# Patient Record
Sex: MALE | Race: WHITE | NOT HISPANIC OR LATINO | Employment: FULL TIME | URBAN - METROPOLITAN AREA
[De-identification: names, ages, dates, MRNs, and addresses within clinical notes are randomized per-mention and may not be internally consistent; named-entity substitution may affect disease eponyms.]

---

## 2024-01-25 ENCOUNTER — APPOINTMENT (EMERGENCY)
Dept: RADIOLOGY | Facility: HOSPITAL | Age: 24
End: 2024-01-25

## 2024-01-25 ENCOUNTER — HOSPITAL ENCOUNTER (EMERGENCY)
Facility: HOSPITAL | Age: 24
Discharge: HOME | End: 2024-01-25
Attending: EMERGENCY MEDICINE

## 2024-01-25 VITALS
WEIGHT: 280 LBS | OXYGEN SATURATION: 100 % | BODY MASS INDEX: 40.09 KG/M2 | HEART RATE: 115 BPM | SYSTOLIC BLOOD PRESSURE: 178 MMHG | RESPIRATION RATE: 18 BRPM | HEIGHT: 70 IN | TEMPERATURE: 97.8 F | DIASTOLIC BLOOD PRESSURE: 109 MMHG

## 2024-01-25 DIAGNOSIS — S62.309A METACARPAL BONE FRACTURE: Primary | ICD-10-CM

## 2024-01-25 DIAGNOSIS — F32.A DEPRESSION, UNSPECIFIED DEPRESSION TYPE: ICD-10-CM

## 2024-01-25 PROCEDURE — 73130 X-RAY EXAM OF HAND: CPT

## 2024-01-25 PROCEDURE — 99284 EMERGENCY DEPT VISIT MOD MDM: CPT

## 2024-01-25 PROCEDURE — 99284 EMERGENCY DEPT VISIT MOD MDM: CPT | Performed by: EMERGENCY MEDICINE

## 2024-01-25 PROCEDURE — 29125 APPL SHORT ARM SPLINT STATIC: CPT | Performed by: EMERGENCY MEDICINE

## 2024-01-26 VITALS
BODY MASS INDEX: 40.09 KG/M2 | HEIGHT: 70 IN | DIASTOLIC BLOOD PRESSURE: 100 MMHG | SYSTOLIC BLOOD PRESSURE: 178 MMHG | WEIGHT: 280 LBS

## 2024-01-26 DIAGNOSIS — S62.366A CLOSED NONDISPLACED FRACTURE OF NECK OF FIFTH METACARPAL BONE OF RIGHT HAND, INITIAL ENCOUNTER: Primary | ICD-10-CM

## 2024-01-26 PROBLEM — S62.336A CLOSED DISPLACED FRACTURE OF NECK OF FIFTH METACARPAL BONE OF RIGHT HAND, INITIAL ENCOUNTER: Status: ACTIVE | Noted: 2024-01-26

## 2024-01-26 RX ORDER — NAPROXEN 500 MG/1
500 TABLET ORAL 2 TIMES DAILY WITH MEALS
Qty: 60 TABLET | Refills: 0 | Status: SHIPPED | OUTPATIENT
Start: 2024-01-26

## 2024-01-26 RX ORDER — TRAMADOL HYDROCHLORIDE 50 MG/1
50 TABLET ORAL
Qty: 10 TABLET | Refills: 0 | Status: SHIPPED | OUTPATIENT
Start: 2024-01-26

## 2024-01-26 NOTE — ED NOTES
This writer discussed the patients current presentation and recommended discharge plan with .  They agree with the patient being discharged at this time with referrals and/or information about Outpatient Therapy      The patient was provided with referral information for:   Outpatient therapy providers.     This writer and the patient completed a safety plan.  The patient was provided with a copy of their safety plan with encouragement to utilize the plan following discharge.   In addition, the patient was instructed to call local [facility] crisis, other crisis services, 911 or to go to the nearest ER immediately if their situation changes at any time.         This writer discussed discharge plans with the patient  who agrees with and understands the discharge plans.         SAFETY PLAN  Warning Signs (thoughts, images, mood, behavior, situations) of a potential crisis: Stressed, depressed      Coping Skills (what can I do to take my mind off the problem, or to keep myself safe): Talk with my friends      Outside Support (who can I reach out to for support and help): Mobile Crisis        [place] Suicide Prevention Hotline:  988      [facility] [phone] - Crisis   [facility] [phone] - LVF Crisis/Mobile Crisis   [phone] - S[facility] Crisis   [facility]: [phone]  [facility]: [phone]   [facility] [phone] - Crisis   [facility] [phone] - Crisis     [facility] [phone] - Crisis   [facility] [phone] - Crisis   [phone] - Peer Support Talk Line (1-9pm daily)  [phone] - Teen Support Talk Line (1-9pm daily)  [phone] - [facility] [phone]- Crisis    [facility] [phone] - Crisis   [facility] [phone] - Crisis    [facility]) [phone] - [facility] Crisis

## 2024-01-26 NOTE — ED NOTES
24 y.o male patient presented to the ED for a hand injury and requested to speak to crisis about SI with no plan.  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 Pt is feeling overwhelmed and stressed with taking care of his Mother..  Pt stated he punched a wall due to being upset.  Pt stated he has been having SI about ending his life when his mother passes.  Pt stated his worries is causing him to have depression and anxiety. Pt stated he has no current plan to end his life but has thought about shooting himself with a gun a while back.  Pt stated he has no access to any guns.  Pt stated he had a suicidal attempt when he was about 12 years old by attempting to overdose.  Pt was hospitalized x2 for mental health at age 12.  Pt has no suicidal attempts since that time at age 12.  Pt currently has no outpatient Psychiatry or Therapist.  Pt denies any legal and hard core substance abuse issues.  Pt stated he does smoke marijuana and drinks occasionally.  Pt requested outpatient treatment information and does not want to go inpatient. Pt provided a safety plan and friend who accompanied him agreed to support the patient. Provider was in agreement with this plan.

## 2024-01-26 NOTE — ED PROVIDER NOTES
History  Chief Complaint   Patient presents with    Finger Injury     Pt reports he punched the wall b/c he was angry at life. Pt reports no pain in right hand. UTD on tetnus. Pt does admit SI with intent no plan after his mom passess away. Has been inpt before. He is looking to speak to crisis for output referrals.        24-year-old male, presents with right hand injury after punching a wall.  Patient states he was upset and punched a wall.  Has swelling in right hand, denies any current pain.  Denies any other injury.  Patient does state that he has been depressed, does have thoughts about ending his life.  Patient states his mother is very sick which is contributing to his depression.  Has not attempted to harm himself.  Patient states he smokes marijuana and drinks occasionally, denies any other drug use.    Family friend who is with patient is concerned that he is suicidal and needs to talk with a crisis worker.      History provided by:  Patient and friend   used: No        None       Past Medical History:   Diagnosis Date    Asthma     GERD (gastroesophageal reflux disease)     IBS (irritable bowel syndrome)        History reviewed. No pertinent surgical history.    History reviewed. No pertinent family history.  I have reviewed and agree with the history as documented.    E-Cigarette/Vaping     E-Cigarette/Vaping Substances     Social History     Tobacco Use    Smoking status: Never    Smokeless tobacco: Never   Substance Use Topics    Alcohol use: Yes     Alcohol/week: 1.0 standard drink of alcohol     Types: 1 Shots of liquor per week     Comment: whiskey    Drug use: Yes     Types: Marijuana       Review of Systems   Constitutional: Negative.    Neurological: Negative.        Physical Exam  Physical Exam  Vitals and nursing note reviewed.   Constitutional:       General: He is not in acute distress.  HENT:      Head: Normocephalic and atraumatic.   Cardiovascular:      Rate and  Rhythm: Tachycardia present.   Pulmonary:      Effort: Pulmonary effort is normal.   Musculoskeletal:         General: Normal range of motion.      Comments: Swelling over right dorsal fifth MP joints, full range of motion, no tenderness   Skin:     General: Skin is warm and dry.   Neurological:      General: No focal deficit present.      Mental Status: He is alert and oriented to person, place, and time.   Psychiatric:      Comments: Depressed mood, calm and cooperative         Vital Signs  ED Triage Vitals [01/25/24 1929]   Temperature Pulse Respirations Blood Pressure SpO2   97.8 °F (36.6 °C) (!) 115 18 (!) 178/109 100 %      Temp Source Heart Rate Source Patient Position - Orthostatic VS BP Location FiO2 (%)   Oral Monitor Sitting Right arm --      Pain Score       No Pain           Vitals:    01/25/24 1929   BP: (!) 178/109   Pulse: (!) 115   Patient Position - Orthostatic VS: Sitting         Visual Acuity      ED Medications  Medications - No data to display    Diagnostic Studies  Results Reviewed       None                   XR hand 3+ views RIGHT    (Results Pending)              Procedures  Splint application    Date/Time: 1/25/2024 8:28 PM    Performed by: Kali Heredia MD  Authorized by: Kali Heredia MD  Universal Protocol:  Consent: Verbal consent obtained.  Consent given by: patient    Pre-procedure details:     Sensation:  Normal  Procedure details:     Laterality:  Right    Location:  Hand    Hand:  R hand    Splint type:  Ulnar gutter    Supplies:  Ortho-Glass, sling and elastic bandage  Post-procedure details:     Pain:  Unchanged    Sensation:  Normal    Patient tolerance of procedure:  Tolerated well, no immediate complications  Comments:      Right ulnar gutter splint placed for fifth metacarpal fracture, patient placed in sling.  Normal capillary refill and sensation in fingers after splint application.           ED Course  ED Course as of 01/25/24 2030   Thu Jan 25, 2024 2003 X-ray  right hand independently reviewed by myself, right fifth metacarpal fracture noted.                               SBIRT 20yo+      Flowsheet Row Most Recent Value   Initial Alcohol Screen: US AUDIT-C     1. How often do you have a drink containing alcohol? 0 Filed at: 01/25/2024 1929   2. How many drinks containing alcohol do you have on a typical day you are drinking?  0 Filed at: 01/25/2024 1929   3a. Male UNDER 65: How often do you have five or more drinks on one occasion? 0 Filed at: 01/25/2024 1929   3b. FEMALE Any Age, or MALE 65+: How often do you have 4 or more drinks on one occassion? 0 Filed at: 01/25/2024 1929   Audit-C Score 0 Filed at: 01/25/2024 1929   NATHAN: How many times in the past year have you...    Used an illegal drug or used a prescription medication for non-medical reasons? Never Filed at: 01/25/2024 1929                      Medical Decision Making  24-year-old male, presents with right hand injury after punching wall.  Patient also reports depression and suicidal ideation.  Differential diagnosis is hand contusion, fracture along with depression, psychosis among other diagnoses.  X-ray of hand ordered, will have ED crisis worker talk with the patient.    Patient evaluated by ED crisis worker, denies any current suicidal ideation, does not believe he requires psychiatric hospitalization at this time and would like to go home with outpatient resources.  Patient feels safe being discharged home.    Placed in splint for fifth metacarpal fracture, ambulatory referral for hand surgeon order placed, patient instructed to follow-up for further evaluation and treatment.    Discussed with patient return to emergency department for any worsening depression or new concerning symptoms.    Amount and/or Complexity of Data Reviewed  Radiology: ordered and independent interpretation performed. Decision-making details documented in ED Course.           I have reviewed test results and diagnosis with patient.   Follow-up plan reviewed.  Precautions for acute return for re-evaluation are reviewed.  Opportunity to ask questions was provided.  Patient verbalizes understanding.    Disposition  Final diagnoses:   Metacarpal bone fracture   Depression, unspecified depression type     Time reflects when diagnosis was documented in both MDM as applicable and the Disposition within this note       Time User Action Codes Description Comment    1/25/2024  8:24 PM Kali Heredia Add [S62.309A] Metacarpal bone fracture     1/25/2024  8:24 PM Kali Heredia Add [F32.A] Depression, unspecified depression type           ED Disposition       ED Disposition   Discharge    Condition   Stable    Date/Time   Thu Jan 25, 2024 2025    Comment   Herman Bautista discharge to home/self care.                   Follow-up Information       Follow up With Specialties Details Why Contact Info    Chi Ritchie MD Orthopedic Surgery, Hand Surgery Schedule an appointment as soon as possible for a visit   [address] [place] [address]  [phone]              Patient's Medications    No medications on file           PDMP Review       None            ED Provider  Electronically Signed by             Kali Heredia MD  01/25/24 3383

## 2024-01-26 NOTE — DISCHARGE INSTRUCTIONS
This writer discussed the patients current presentation and recommended discharge plan with .  They agree with the patient being discharged at this time with referrals and/or information about Outpatient Therapy      The patient was provided with referral information for:   Outpatient therapy providers.      This writer and the patient completed a safety plan.  The patient was provided with a copy of their safety plan with encouragement to utilize the plan following discharge.   In addition, the patient was instructed to call local [facility] crisis, other crisis services, 911 or to go to the nearest ER immediately if their situation changes at any time.            This writer discussed discharge plans with the patient  who agrees with and understands the discharge plans.            SAFETY PLAN  Warning Signs (thoughts, images, mood, behavior, situations) of a potential crisis: Stressed, depressed        Coping Skills (what can I do to take my mind off the problem, or to keep myself safe): Talk with my friends        Outside Support (who can I reach out to for support and help): Mobile Crisis           National Suicide Prevention Hotline:  988       [facility] [phone] - Crisis   [facility] [phone] - LVF Crisis/Mobile Crisis   [phone] - SLPF Crisis   [facility]: [phone]  [facility]: [phone]   [facility] [phone] - Crisis   [facility] [phone] - Crisis      [facility] [phone] - Crisis   [facility] [phone] - Crisis   [phone] - Peer Support Talk Line (1-9pm daily)  [phone] - Teen Support Talk Line (1-9pm daily)  [phone] - [facility] [phone]- Crisis    [facility] [phone] - Crisis   [facility] [phone] - Crisis    [facility] hospital) [phone] - [facility] Crisis

## 2024-01-26 NOTE — PROGRESS NOTES
"Patient Name:  Herman Bautista  MRN:  [id number]    Assessment & Plan     Right fifth metacarpal neck fracture after injury 1/25/2024.  Reviewed radiographs with the patient which do reveal 1/5 metacarpal neck fracture with approximately 50 degrees of angulation.  There is no evidence of crossover or rotational deformity at this time.  Fracture alignment is amenable to conservative management at this time.  Patient will be placed in an ulnar gutter brace at this time.  This is to be worn at all times except for hygiene.  Prescription for naproxen.  Patient also requested pain medication.  One-time prescription of tramadol provided today.  Follow[facility] in 4 weeks at the [facility].    Chief Complaint     Right hand injury    History of the Present Illness     Herman Bautista is a 24 y.o. right-hand-dominant male who reports to the office today for evaluation of his right hand.  Patient states he punched a wall last evening with his right hand which resulted in an injury to the right hand.  He states he believes he hit the stud in the wall.  He states he felt a pop in the ulnar aspect of his hand and noted onset of severe pain.  He did report to the emergency department.  At that time x-rays were performed revealing 1/5 metacarpal neck fracture.  He was placed in an ulnar gutter splint.  Currently he still notes persistent pain rated 5-6 out of 10 in intensity.  He has been taking ibuprofen with limited improvement.  No significant numbness and tingling.  No fevers or chills.  No prior injuries to this hand.    Physical Exam     BP (!) 178/100   Ht 5' 10\" (1.778 m)   Wt 127 kg (280 lb)   BMI 40.18 kg/m²     Right hand: Numbers deformity.  Skin intact.  Significant soft tissue swelling and ecchymosis are present about the ulnar aspect of the hand.  No erythema.  There is significant tenderness over the distal portion of the fifth metacarpal.  No significant tenderness over the remainder of the metacarpals.  No " tenderness over the carpus.  No tenderness over the distal radius and distal ulna.  Wrist range of motion is intact and nearly full with slight pain.  Near full composite fist formation with pain.  No evidence of rotational crossover deformity.  Sensation intact median ulnar and radial nerves.  2+ radial pulse.    Eyes: Anicteric sclerae.  ENT: Trachea midline.  Lungs: Normal respiratory effort.  CV: Capillary refill is less than 2 seconds.  Skin: Intact without erythema.  Lymph: No palpable lymphadenopathy.  Neuro: Sensation is grossly intact to light touch.  Psych: Mood and affect are appropriate.    Data Review     I have personally reviewed pertinent films in PACS, and my interpretation follows:    X-rays right hand 1/25/2024: Metacarpal neck fracture with 50 degrees of angulation.  No significant displacement.    Past Medical History:   Diagnosis Date    Asthma     GERD (gastroesophageal reflux disease)     IBS (irritable bowel syndrome)        History reviewed. No pertinent surgical history.    Allergies   Allergen Reactions    Eggs Or Egg-Derived Products - Food Allergy Rash    Latex Rash    Milk-Related Compounds - Food Allergy Rash    Penicillins Rash    Reglan [Metoclopramide] Rash       No current outpatient medications on file prior to visit.     No current facility-administered medications on file prior to visit.       Social History     Tobacco Use    Smoking status: Never    Smokeless tobacco: Never   Substance Use Topics    Alcohol use: Yes     Alcohol/week: 1.0 standard drink of alcohol     Types: 1 Shots of liquor per week     Comment: whiskey    Drug use: Yes     Types: Marijuana       History reviewed. No pertinent family history.    Review of Systems     As stated in the HPI. All other systems reviewed and are negative.